# Patient Record
Sex: FEMALE | Race: WHITE | NOT HISPANIC OR LATINO | Employment: STUDENT | URBAN - METROPOLITAN AREA
[De-identification: names, ages, dates, MRNs, and addresses within clinical notes are randomized per-mention and may not be internally consistent; named-entity substitution may affect disease eponyms.]

---

## 2017-01-24 ENCOUNTER — APPOINTMENT (EMERGENCY)
Dept: CT IMAGING | Facility: HOSPITAL | Age: 22
End: 2017-01-24
Payer: COMMERCIAL

## 2017-01-24 ENCOUNTER — HOSPITAL ENCOUNTER (EMERGENCY)
Facility: HOSPITAL | Age: 22
Discharge: HOME | End: 2017-01-24
Attending: EMERGENCY MEDICINE | Admitting: EMERGENCY MEDICINE
Payer: COMMERCIAL

## 2017-01-24 VITALS
OXYGEN SATURATION: 99 % | DIASTOLIC BLOOD PRESSURE: 58 MMHG | WEIGHT: 119 LBS | TEMPERATURE: 97.9 F | SYSTOLIC BLOOD PRESSURE: 106 MMHG | RESPIRATION RATE: 17 BRPM | HEART RATE: 81 BPM

## 2017-01-24 DIAGNOSIS — R51.9 HEADACHE DISORDER: Primary | ICD-10-CM

## 2017-01-24 LAB
ANION GAP SERPL CALCULATED.3IONS-SCNC: 8 MMOL/L (ref 4–13)
BASOPHILS # BLD AUTO: 0.02 THOUSANDS/ΜL (ref 0–0.1)
BASOPHILS NFR BLD AUTO: 0 % (ref 0–1)
BUN SERPL-MCNC: 15 MG/DL (ref 5–25)
CALCIUM SERPL-MCNC: 8.8 MG/DL (ref 8.3–10.1)
CHLORIDE SERPL-SCNC: 103 MMOL/L (ref 100–108)
CO2 SERPL-SCNC: 28 MMOL/L (ref 21–32)
CREAT SERPL-MCNC: 0.6 MG/DL (ref 0.6–1.3)
EOSINOPHIL # BLD AUTO: 0.14 THOUSAND/ΜL (ref 0–0.61)
EOSINOPHIL NFR BLD AUTO: 2 % (ref 0–6)
ERYTHROCYTE [DISTWIDTH] IN BLOOD BY AUTOMATED COUNT: 11.8 % (ref 11.6–15.1)
GFR SERPL CREATININE-BSD FRML MDRD: >60 ML/MIN/1.73SQ M
GLUCOSE SERPL-MCNC: 95 MG/DL (ref 65–140)
HCT VFR BLD AUTO: 37.7 % (ref 34.8–46.1)
HGB BLD-MCNC: 12.9 G/DL (ref 11.5–15.4)
LYMPHOCYTES # BLD AUTO: 1.01 THOUSANDS/ΜL (ref 0.6–4.47)
LYMPHOCYTES NFR BLD AUTO: 14 % (ref 14–44)
MCH RBC QN AUTO: 28.2 PG (ref 26.8–34.3)
MCHC RBC AUTO-ENTMCNC: 34.2 G/DL (ref 31.4–37.4)
MCV RBC AUTO: 83 FL (ref 82–98)
MONOCYTES # BLD AUTO: 0.51 THOUSAND/ΜL (ref 0.17–1.22)
MONOCYTES NFR BLD AUTO: 7 % (ref 4–12)
NEUTROPHILS # BLD AUTO: 5.61 THOUSANDS/ΜL (ref 1.85–7.62)
NEUTS SEG NFR BLD AUTO: 77 % (ref 43–75)
PLATELET # BLD AUTO: 265 THOUSANDS/UL (ref 149–390)
PMV BLD AUTO: 9 FL (ref 8.9–12.7)
POTASSIUM SERPL-SCNC: 3.8 MMOL/L (ref 3.5–5.3)
RBC # BLD AUTO: 4.57 MILLION/UL (ref 3.81–5.12)
SODIUM SERPL-SCNC: 139 MMOL/L (ref 136–145)
WBC # BLD AUTO: 7.29 THOUSAND/UL (ref 4.31–10.16)

## 2017-01-24 PROCEDURE — 99284 EMERGENCY DEPT VISIT MOD MDM: CPT

## 2017-01-24 PROCEDURE — 36415 COLL VENOUS BLD VENIPUNCTURE: CPT | Performed by: PHYSICIAN ASSISTANT

## 2017-01-24 PROCEDURE — 80048 BASIC METABOLIC PNL TOTAL CA: CPT | Performed by: PHYSICIAN ASSISTANT

## 2017-01-24 PROCEDURE — 96374 THER/PROPH/DIAG INJ IV PUSH: CPT

## 2017-01-24 PROCEDURE — 85025 COMPLETE CBC W/AUTO DIFF WBC: CPT | Performed by: PHYSICIAN ASSISTANT

## 2017-01-24 PROCEDURE — 70486 CT MAXILLOFACIAL W/O DYE: CPT

## 2017-01-24 PROCEDURE — 96375 TX/PRO/DX INJ NEW DRUG ADDON: CPT

## 2017-01-24 PROCEDURE — 70450 CT HEAD/BRAIN W/O DYE: CPT

## 2017-01-24 PROCEDURE — 96361 HYDRATE IV INFUSION ADD-ON: CPT

## 2017-01-24 RX ORDER — METOCLOPRAMIDE 10 MG/1
10 TABLET ORAL 4 TIMES DAILY
Qty: 12 TABLET | Refills: 0 | Status: SHIPPED | OUTPATIENT
Start: 2017-01-24 | End: 2017-01-27

## 2017-01-24 RX ORDER — LEVOTHYROXINE SODIUM 0.1 MG/1
100 TABLET ORAL DAILY
COMMUNITY

## 2017-01-24 RX ORDER — FLUCONAZOLE 150 MG/1
150 TABLET ORAL ONCE
Qty: 2 TABLET | Refills: 0 | Status: SHIPPED | OUTPATIENT
Start: 2017-01-24 | End: 2017-01-24

## 2017-01-24 RX ORDER — DIPHENHYDRAMINE HYDROCHLORIDE 50 MG/ML
25 INJECTION INTRAMUSCULAR; INTRAVENOUS ONCE
Status: COMPLETED | OUTPATIENT
Start: 2017-01-24 | End: 2017-01-24

## 2017-01-24 RX ORDER — METOCLOPRAMIDE HYDROCHLORIDE 5 MG/ML
10 INJECTION INTRAMUSCULAR; INTRAVENOUS ONCE
Status: COMPLETED | OUTPATIENT
Start: 2017-01-24 | End: 2017-01-24

## 2017-01-24 RX ADMIN — METOCLOPRAMIDE 10 MG: 5 INJECTION, SOLUTION INTRAMUSCULAR; INTRAVENOUS at 10:50

## 2017-01-24 RX ADMIN — DIPHENHYDRAMINE HYDROCHLORIDE 25 MG: 50 INJECTION, SOLUTION INTRAMUSCULAR; INTRAVENOUS at 10:49

## 2017-01-24 RX ADMIN — SODIUM CHLORIDE 1000 ML: 0.9 INJECTION, SOLUTION INTRAVENOUS at 10:51

## 2018-12-12 ENCOUNTER — OFFICE VISIT (OUTPATIENT)
Dept: OBGYN CLINIC | Facility: CLINIC | Age: 23
End: 2018-12-12
Payer: COMMERCIAL

## 2018-12-12 VITALS
BODY MASS INDEX: 23 KG/M2 | WEIGHT: 125 LBS | HEIGHT: 62 IN | SYSTOLIC BLOOD PRESSURE: 120 MMHG | DIASTOLIC BLOOD PRESSURE: 68 MMHG

## 2018-12-12 DIAGNOSIS — Z01.419 ENCOUNTER FOR GYNECOLOGICAL EXAMINATION WITHOUT ABNORMAL FINDING: ICD-10-CM

## 2018-12-12 DIAGNOSIS — Z11.3 SCREEN FOR STD (SEXUALLY TRANSMITTED DISEASE): Primary | ICD-10-CM

## 2018-12-12 PROCEDURE — 87591 N.GONORRHOEAE DNA AMP PROB: CPT | Performed by: PHYSICIAN ASSISTANT

## 2018-12-12 PROCEDURE — S0610 ANNUAL GYNECOLOGICAL EXAMINA: HCPCS | Performed by: PHYSICIAN ASSISTANT

## 2018-12-12 PROCEDURE — G0145 SCR C/V CYTO,THINLAYER,RESCR: HCPCS | Performed by: PHYSICIAN ASSISTANT

## 2018-12-12 PROCEDURE — 87491 CHLMYD TRACH DNA AMP PROBE: CPT | Performed by: PHYSICIAN ASSISTANT

## 2018-12-12 NOTE — PROGRESS NOTES
Marissa Sanchez  1995    CC:  Yearly exam    S:  26 y o  female here for yearly exam  Her cycles are regular, somewhat heavy and crampy  She is not currently sexually active  She uses condoms and abstinence for contraception  She does request STD testing today  She was dx with Factor V Leiden mutation about two years ago  She was tested because her grandmother is positive  She herself has never had a clot  I asked her to try to get me a copy of her labs to determine if she is homozygous or heterozygous and we discussed implications of this for pregnancy in the future  Last Pap never    Current Outpatient Prescriptions:     levothyroxine 100 mcg tablet, Take 100 mcg by mouth daily, Disp: , Rfl:   Social History     Social History    Marital status: Single     Spouse name: N/A    Number of children: N/A    Years of education: N/A     Occupational History    Not on file  Social History Main Topics    Smoking status: Never Smoker    Smokeless tobacco: Never Used    Alcohol use Yes      Comment: socially     Drug use: No    Sexual activity: Yes     Partners: Male     Birth control/ protection: Condom Male     Other Topics Concern    Not on file     Social History Narrative    No narrative on file     Family History   Problem Relation Age of Onset   Erin Mare Migraines Mother     Thyroid disease Mother     Hypertension Mother     No Known Problems Father     Thyroid disease Sister     Factor V Leiden deficiency Paternal Grandmother      Past Medical History:   Diagnosis Date    Chlamydia     [facility]     Disease of thyroid gland     [facility] Le[facility] carrier [facility]     Migraine     Urinary tract infection          O:  Blood pressure 120/68, height 5' 1 81" (1 57 m), weight 56 7 kg (125 lb), last menstrual period 12/01/2018      Patient appears well and is not in distress  Neck is supple without masses  Breasts are symmetrical without mass, tenderness, nipple discharge, skin changes or adenopathy  Abdomen is soft and nontender without masses  External genitals are normal without lesions or rashes  Vagina is normal without discharge or bleeding  Cervix is normal without discharge or lesion  Uterus is normal, mobile, nontender without palpable mass  Adnexa are normal, nontender, without palpable mass  A:  Yearly exam      P:   Pap and cultures today   Will check Kyleena coverage and contact her to    schedule insertion   RTO one year for yearly exam or sooner as needed

## 2018-12-13 LAB
C TRACH DNA SPEC QL NAA+PROBE: NEGATIVE
N GONORRHOEA DNA SPEC QL NAA+PROBE: NEGATIVE

## 2018-12-14 ENCOUNTER — TELEPHONE (OUTPATIENT)
Dept: OBGYN CLINIC | Facility: CLINIC | Age: 23
End: 2018-12-14

## 2018-12-14 ENCOUNTER — TELEPHONE (OUTPATIENT)
Dept: OBGYN CLINIC | Facility: MEDICAL CENTER | Age: 23
End: 2018-12-14

## 2018-12-14 NOTE — TELEPHONE ENCOUNTER
Spoke to Ciera at [facility]  Pt does not have active coverage on the card on file  Daxa Cordero will call pt to see if her card is not up to date

## 2018-12-14 NOTE — TELEPHONE ENCOUNTER
Pt  interested in Tate Smith with Rita @ [facility], insurance coverage is not active, LMOM to call us back with new insurance information

## 2018-12-17 ENCOUNTER — TELEPHONE (OUTPATIENT)
Dept: OBGYN CLINIC | Facility: CLINIC | Age: 23
End: 2018-12-17

## 2018-12-17 LAB
LAB AP GYN PRIMARY INTERPRETATION: NORMAL
Lab: NORMAL

## 2018-12-19 ENCOUNTER — TELEPHONE (OUTPATIENT)
Dept: OBGYN CLINIC | Facility: CLINIC | Age: 23
End: 2018-12-19

## 2018-12-19 NOTE — TELEPHONE ENCOUNTER
We do not have a current Insurance card on file  I called Bailey Arevalo to check on her status on her insurance and left a message for her to call back

## 2018-12-20 NOTE — TELEPHONE ENCOUNTER
Pt called- she will be bringing her insurance card in next week and we can use that info to call for her Deatra Seat

## 2019-01-03 NOTE — TELEPHONE ENCOUNTER
Spoke to [facility] at "[employer]  Pt is covered for [facility], insertion, and removal, at 100% with no copay or deductible

## 2019-01-08 NOTE — TELEPHONE ENCOUNTER
Prasad and [place] labeled and placed in [facility] room  Pt is scheduled for insertion

## 2019-01-29 ENCOUNTER — OFFICE VISIT (OUTPATIENT)
Dept: OBGYN CLINIC | Facility: CLINIC | Age: 24
End: 2019-01-29
Payer: COMMERCIAL

## 2019-01-29 VITALS — DIASTOLIC BLOOD PRESSURE: 64 MMHG | WEIGHT: 131 LBS | BODY MASS INDEX: 24.11 KG/M2 | SYSTOLIC BLOOD PRESSURE: 100 MMHG

## 2019-01-29 DIAGNOSIS — Z30.430 ENCOUNTER FOR IUD INSERTION: Primary | ICD-10-CM

## 2019-01-29 PROCEDURE — 58300 INSERT INTRAUTERINE DEVICE: CPT | Performed by: PHYSICIAN ASSISTANT

## 2019-01-29 NOTE — PROGRESS NOTES
Quiana Almazan  1995      CC:  IUD insertion    S:  26 y o  female here for IUD insertion  She requests [place] IUD  We reviewed the risks of IUD insertion including pain, perforation, migration, irregular bleeding, failure, and infection  She is aware that with [place] she can expect upt o 6 months of irregular bleeding at at that point her periods should become lighter, shorter, and less crampy, and that 10% of women stop getting periods with their [place]  She did premedicate with ibuprofen and a snack prior to insertion  Written consent was obtained from the patient, myself, and Dr Elisha Guerrero prior to the procedure  Past Medical History:   Diagnosis Date    Chlamydia     [facility]     Disease of thyroid gland     [facility] Leiden carrier ([facility] )     Migraine     Urinary tract infection      Family History   Problem Relation Age of Onset   26 [facility] Migraines Mother     Thyroid disease Mother     Hypertension Mother     No Known Problems Father     Thyroid disease Sister     Factor V Leiden deficiency Paternal Grandmother      Social History     Social History    Marital status: Single     Spouse name: N/A    Number of children: N/A    Years of education: N/A     Social History Main Topics    Smoking status: Never Smoker    Smokeless tobacco: Never Used    Alcohol use Yes      Comment: socially     Drug use: No    Sexual activity: Yes     Partners: Male     Birth control/ protection: Condom Male     Other Topics Concern    None     Social History Narrative    None       O:   Blood pressure 100/64, weight 59 4 kg (131 lb), last menstrual period 01/24/2019  Patient appears well and is not in distress  Abdomen is soft and nontender  External genitals are normal without lesion or rash  Vagina is normal with menses present  Cervix is normal without lesion  PROCEDURE:   The cervix was cleansed with Betadine      The uterus was sounded to 7 cm  IUD was inserted to the fundus without dilation and without tenaculum  Strings were trimmed to 3cm    The patient tolerated the procedure well without complication  A:  IUD insertion  P: The patient will return in one month for IUD check but knows to call sooner should she have problems prior to that visit

## 2019-02-28 ENCOUNTER — OFFICE VISIT (OUTPATIENT)
Dept: OBGYN CLINIC | Facility: CLINIC | Age: 24
End: 2019-02-28
Payer: COMMERCIAL

## 2019-02-28 DIAGNOSIS — N92.6 IRREGULAR BLEEDING: Primary | ICD-10-CM

## 2019-02-28 PROCEDURE — 99213 OFFICE O/P EST LOW 20 MIN: CPT | Performed by: PHYSICIAN ASSISTANT

## 2019-02-28 NOTE — PROGRESS NOTES
Altagracia Ismael  1995      CC: IUD check    S: 26 y o  female here for IUD check  She is status post placement of a [place] IUD on 12/12/18  She has had mild irregular bleeding since placement but nothing bothersome to her  She has had no pain or other side effects  No LMP recorded      Current Outpatient Medications:     levothyroxine 100 mcg tablet, Take 100 mcg by mouth daily, Disp: , Rfl:   Social History     Socioeconomic History    Marital status: Single     Spouse name: Not on file    Number of children: Not on file    Years of education: Not on file    Highest education level: Not on file   Occupational History    Not on file   Social Needs    Financial resource strain: Not on file    Food insecurity:     Worry: Not on file     Inability: Not on file    Transportation needs:     Medical: Not on file     Non-medical: Not on file   Tobacco Use    Smoking status: Never Smoker    Smokeless tobacco: Never Used   Substance and Sexual Activity    Alcohol use: Yes     Comment: socially     Drug use: No    Sexual activity: Yes     Partners: Male     Birth control/protection: Condom Male   Lifestyle    Physical activity:     Days per week: Not on file     Minutes per session: Not on file    Stress: Not on file   Relationships    Social connections:     Talks on phone: Not on file     Gets together: Not on file     Attends Voodoo service: Not on file     Active member of club or organization: Not on file     Attends meetings of clubs or organizations: Not on file     Relationship status: Not on file    Intimate partner violence:     Fear of current or ex partner: Not on file     Emotionally abused: Not on file     Physically abused: Not on file     Forced sexual activity: Not on file   Other Topics Concern    Not on file   Social History Narrative    Not on file     Family History   Problem Relation Age of Onset    Migraines Mother     Thyroid disease Mother     Hypertension Mother     No Known Problems Father     Thyroid disease Sister     Factor V Leiden deficiency Paternal Grandmother      Past Medical History:   Diagnosis Date    Chlamydia     [facility]     Disease of thyroid gland     Factor V Leiden carrier ([facility] )     Migraine     Urinary tract infection        O:  There were no vitals taken for this visit  Abdomen is soft and nontender  External genitals are normal without rashes or lesions  Vagina is normal without discharge or bleeding  Cervix is normal without discharge or lesion  IUD strings are normal      A:  IUD in place    P:  Patient was reassured that irregular bleeding is common for the first six months of IUD use and that this should slowly resolve over time  She will call with any persistently abnormal bleeding or other problems  She will return for her yearly exam as scheduled

## 2019-04-10 ENCOUNTER — OFFICE VISIT (OUTPATIENT)
Dept: OBGYN CLINIC | Facility: CLINIC | Age: 24
End: 2019-04-10
Payer: COMMERCIAL

## 2019-04-10 VITALS — WEIGHT: 127 LBS | SYSTOLIC BLOOD PRESSURE: 130 MMHG | BODY MASS INDEX: 23.37 KG/M2 | DIASTOLIC BLOOD PRESSURE: 78 MMHG

## 2019-04-10 DIAGNOSIS — N89.8 VAGINAL ITCHING: Primary | ICD-10-CM

## 2019-04-10 PROCEDURE — 87660 TRICHOMONAS VAGIN DIR PROBE: CPT | Performed by: PHYSICIAN ASSISTANT

## 2019-04-10 PROCEDURE — 99214 OFFICE O/P EST MOD 30 MIN: CPT | Performed by: PHYSICIAN ASSISTANT

## 2019-04-10 PROCEDURE — 87480 CANDIDA DNA DIR PROBE: CPT | Performed by: PHYSICIAN ASSISTANT

## 2019-04-10 PROCEDURE — 87510 GARDNER VAG DNA DIR PROBE: CPT | Performed by: PHYSICIAN ASSISTANT

## 2019-04-10 RX ORDER — METRONIDAZOLE 7.5 MG/G
1 GEL VAGINAL
Qty: 70 G | Refills: 0 | Status: SHIPPED | OUTPATIENT
Start: 2019-04-10 | End: 2019-04-15

## 2019-04-12 LAB
CANDIDA RRNA VAG QL PROBE: NEGATIVE
G VAGINALIS RRNA GENITAL QL PROBE: NEGATIVE
T VAGINALIS RRNA GENITAL QL PROBE: NEGATIVE

## 2019-12-20 ENCOUNTER — ANNUAL EXAM (OUTPATIENT)
Dept: OBGYN CLINIC | Facility: CLINIC | Age: 24
End: 2019-12-20
Payer: COMMERCIAL

## 2019-12-20 VITALS
SYSTOLIC BLOOD PRESSURE: 120 MMHG | WEIGHT: 131 LBS | BODY MASS INDEX: 24.11 KG/M2 | DIASTOLIC BLOOD PRESSURE: 68 MMHG | HEIGHT: 62 IN

## 2019-12-20 DIAGNOSIS — Z01.419 ENCNTR FOR GYN EXAM (GENERAL) (ROUTINE) W/O ABN FINDINGS: ICD-10-CM

## 2019-12-20 PROBLEM — N89.8 VAGINAL ITCHING: Status: RESOLVED | Noted: 2019-04-10 | Resolved: 2019-12-20

## 2019-12-20 PROBLEM — D68.51 FACTOR V LEIDEN MUTATION (HCC): Status: ACTIVE | Noted: 2019-12-20

## 2019-12-20 PROCEDURE — S0612 ANNUAL GYNECOLOGICAL EXAMINA: HCPCS | Performed by: PHYSICIAN ASSISTANT

## 2019-12-20 PROCEDURE — 87591 N.GONORRHOEAE DNA AMP PROB: CPT | Performed by: PHYSICIAN ASSISTANT

## 2019-12-20 PROCEDURE — 87491 CHLMYD TRACH DNA AMP PROBE: CPT | Performed by: PHYSICIAN ASSISTANT

## 2019-12-20 NOTE — PROGRESS NOTES
Elena Vera  1995    CC:  Yearly exam    S:  27 y o  female here for yearly exam  Her cycles are regular, not heavy or crampy  They are shorter and lighter than prior to her [place]  Recall she has Factor V and is not an estrogen candidate  Sexual activity: She is sexually active with no current partner without pain, bleeding or dryness  Contraception:  She uses [place] for contraception  STD testing:  She does request STD testing today  We reviewed [facility] guidelines for Pap testing       Last Pap 12/12/18 neg    Family hx of breast cancer: no  Family hx of ovarian cancer: no  Family hx of colon cancer: no    Current Outpatient Medications:     levonorgestrel (KYLEENA) 19 5 MG intrauterine device, 1 Intra Uterine Device by Intrauterine route once, Disp: , Rfl:     levothyroxine 100 mcg tablet, Take 100 mcg by mouth daily, Disp: , Rfl:   Social History     Socioeconomic History    Marital status: Single     Spouse name: Not on file    Number of children: Not on file    Years of education: Not on file    Highest education level: Not on file   Occupational History    Not on file   Social Needs    Financial resource strain: Not on file    Food insecurity:     Worry: Not on file     Inability: Not on file    Transportation needs:     Medical: Not on file     Non-medical: Not on file   Tobacco Use    Smoking status: Never Smoker    Smokeless tobacco: Never Used   Substance and Sexual Activity    Alcohol use: Yes     Comment: socially     Drug use: No    Sexual activity: Yes     Partners: Male     Birth control/protection: Condom Male, IUD     Comment: [facility] 1/2019   Lifestyle    Physical activity:     Days per week: Not on file     Minutes per session: Not on file    Stress: Not on file   Relationships    Social connections:     Talks on phone: Not on file     Gets together: Not on file     Attends Mandaeism service: Not on file     Active member of club or organization: Not on file     Attends meetings of clubs or organizations: Not on file     Relationship status: Not on file    Intimate partner violence:     Fear of current or ex partner: Not on file     Emotionally abused: Not on file     Physically abused: Not on file     Forced sexual activity: Not on file   Other Topics Concern    Not on file   Social History Narrative    Not on file     Family History   Problem Relation Age of Onset    Migraines Mother     Thyroid disease Mother     Hypertension Mother     No Known Problems Father     Thyroid disease Sister     Factor V Leiden deficiency Paternal Grandmother      Past Medical History:   Diagnosis Date    Chlamydia     [facility] ([facility] )     Disease of thyroid gland     Factor [facility] Leiden carrier ([facility] )     Migraine     Urinary tract infection        Review of Systems   Respiratory: Negative  Cardiovascular: Negative  Gastrointestinal: Negative for constipation and diarrhea  Genitourinary: Negative for difficulty urinating, pelvic pain, vaginal bleeding, vaginal discharge, itching or odor  O:  Blood pressure 120/68, height 5' 2 21" (1 58 m), weight 59 4 kg (131 lb), last menstrual period 12/03/2019  Patient appears well and is not in distress  Neck is supple without masses  Breasts are symmetrical without mass, tenderness, nipple discharge, skin changes or adenopathy  Abdomen is soft and nontender without masses  External genitals are normal without lesions or rashes  Urethra and urethral meatus are normal  Bladder is normal to palpation  Vagina is normal without discharge or bleeding  Cervix is normal without discharge or lesion  IUD strings are normal    Uterus is normal, mobile, nontender without palpable mass  Adnexa are normal, nontender, without palpable mass  A:  Yearly exam      P:   Pap 2021   GC/chlamydia today     RTO one year for yearly exam or sooner as needed

## 2019-12-24 LAB
C TRACH DNA SPEC QL NAA+PROBE: NEGATIVE
N GONORRHOEA DNA SPEC QL NAA+PROBE: NEGATIVE

## 2020-04-03 ENCOUNTER — TELEMEDICINE (OUTPATIENT)
Dept: OBGYN CLINIC | Facility: CLINIC | Age: 25
End: 2020-04-03
Payer: COMMERCIAL

## 2020-04-03 DIAGNOSIS — Z11.3 SCREEN FOR STD (SEXUALLY TRANSMITTED DISEASE): Primary | ICD-10-CM

## 2020-04-03 PROCEDURE — 99213 OFFICE O/P EST LOW 20 MIN: CPT | Performed by: PHYSICIAN ASSISTANT

## 2020-04-06 ENCOUNTER — APPOINTMENT (OUTPATIENT)
Dept: LAB | Facility: HOSPITAL | Age: 25
End: 2020-04-06
Payer: COMMERCIAL

## 2020-04-06 ENCOUNTER — TRANSCRIBE ORDERS (OUTPATIENT)
Dept: LAB | Facility: HOSPITAL | Age: 25
End: 2020-04-06

## 2020-04-06 DIAGNOSIS — Z11.3 SCREEN FOR STD (SEXUALLY TRANSMITTED DISEASE): ICD-10-CM

## 2020-04-06 PROCEDURE — 87591 N.GONORRHOEAE DNA AMP PROB: CPT

## 2020-04-06 PROCEDURE — 87491 CHLMYD TRACH DNA AMP PROBE: CPT

## 2020-04-08 LAB
C TRACH DNA SPEC QL NAA+PROBE: NEGATIVE
N GONORRHOEA DNA SPEC QL NAA+PROBE: NEGATIVE

## 2020-08-14 ENCOUNTER — TELEPHONE (OUTPATIENT)
Dept: OBGYN CLINIC | Facility: CLINIC | Age: 25
End: 2020-08-14

## 2020-08-14 NOTE — TELEPHONE ENCOUNTER
----- Message from Primo Patel sent at 8/13/2020 11:59 PM EDT -----  Regarding: Non-Urgent Medical Question  Contact: [phone]  Krunal Gottlieb, I hope you are well  I recently have become intimate with a new partner  I wanted to get checked for STIs  Do I need to schedule an appt with you or should I go through the lab services again?     Kimberly Conrad

## 2020-08-14 NOTE — TELEPHONE ENCOUNTER
I called and spoke to pt inquiring if any symptoms  She is not having anything- I told her if she can call to see if there are any cancellations/if any concerns arise or if she develops symptoms  I have her scheduled with Dr Jj Wiggins on wed 8/26 at 2:40 in [place] office  She verbalized understanding

## 2020-08-26 ENCOUNTER — OFFICE VISIT (OUTPATIENT)
Dept: OBGYN CLINIC | Facility: CLINIC | Age: 25
End: 2020-08-26
Payer: COMMERCIAL

## 2020-08-26 VITALS
BODY MASS INDEX: 23.44 KG/M2 | WEIGHT: 129 LBS | TEMPERATURE: 97.9 F | SYSTOLIC BLOOD PRESSURE: 122 MMHG | DIASTOLIC BLOOD PRESSURE: 80 MMHG

## 2020-08-26 DIAGNOSIS — Z11.3 SCREENING FOR STD (SEXUALLY TRANSMITTED DISEASE): Primary | ICD-10-CM

## 2020-08-26 DIAGNOSIS — B37.3 YEAST VAGINITIS: ICD-10-CM

## 2020-08-26 LAB
BV WHIFF TEST VAG QL: NEGATIVE
CLUE CELLS SPEC QL WET PREP: NEGATIVE
PH SMN: ABNORMAL [PH]
T VAGINALIS VAG QL WET PREP: NEGATIVE
YEAST VAG QL WET PREP: ABNORMAL

## 2020-08-26 PROCEDURE — 87491 CHLMYD TRACH DNA AMP PROBE: CPT | Performed by: STUDENT IN AN ORGANIZED HEALTH CARE EDUCATION/TRAINING PROGRAM

## 2020-08-26 PROCEDURE — 87591 N.GONORRHOEAE DNA AMP PROB: CPT | Performed by: STUDENT IN AN ORGANIZED HEALTH CARE EDUCATION/TRAINING PROGRAM

## 2020-08-26 PROCEDURE — 99214 OFFICE O/P EST MOD 30 MIN: CPT | Performed by: STUDENT IN AN ORGANIZED HEALTH CARE EDUCATION/TRAINING PROGRAM

## 2020-08-26 PROCEDURE — 87210 SMEAR WET MOUNT SALINE/INK: CPT | Performed by: STUDENT IN AN ORGANIZED HEALTH CARE EDUCATION/TRAINING PROGRAM

## 2020-08-26 RX ORDER — NYSTATIN AND TRIAMCINOLONE ACETONIDE 100000; 1 [USP'U]/G; MG/G
OINTMENT TOPICAL 2 TIMES DAILY
Qty: 30 G | Refills: 0 | Status: SHIPPED | OUTPATIENT
Start: 2020-08-26 | End: 2020-12-14 | Stop reason: ALTCHOICE

## 2020-08-26 RX ORDER — FLUCONAZOLE 150 MG/1
150 TABLET ORAL ONCE
Qty: 1 TABLET | Refills: 0 | Status: SHIPPED | OUTPATIENT
Start: 2020-08-26 | End: 2020-08-26

## 2020-08-26 RX ORDER — VALACYCLOVIR HYDROCHLORIDE 1 G/1
TABLET, FILM COATED ORAL
COMMUNITY
Start: 2020-07-13

## 2020-08-26 NOTE — ASSESSMENT & PLAN NOTE
26 yo  here for STD testing  New partner since early August with vaginal irritation  Essentially resolved after last cycle  GCCT sent from urine  Serum screening per request    Wet mount with hyphae, diflucan and mycolog sent  To return if worsens or no improvement

## 2020-08-26 NOTE — PROGRESS NOTES
Assessment/Plan:   Screening for STD (sexually transmitted disease)  26 yo  here for STD testing  New partner since early August with vaginal irritation  Essentially resolved after last cycle  GCCT sent from urine  Serum screening per request    Wet mount with hyphae, diflucan and mycolog sent  To return if worsens or no improvement  Reviewed condom use  Diagnoses and all orders for this visit:    Screening for STD (sexually transmitted disease)  -     RPR; Future  -     Hepatitis B core antibody, IgM; Future  -     Hepatitis C antibody; Future  -     Chlamydia/GC amplified DNA by PCR  -     HIV 1/2 Antigen/Antibody (4th Generation) w Reflex [facility]; Future    Other orders  -     Cancel: HIV 1/2 ANTIGEN/ANTIBODY (4TH GENERATION) W REFLEX [facility]; Future  -     valACYclovir (VALTREX) 1,000 mg tablet; TAKE 1 TABLET BY MOUTH TWICE A DAY FOR 2 DAYS          Subjective:     Patient ID: Joy Villafana is a 27 y o  female  26 yo here for problem visit with vaginal irritation  She reports shortly after being with new partner for first time in early august onset of vaginal discharge, irritation and odor  She then started her cycle  and her symptoms resolved with exception of mild irritation at introitus  She has regular periods with Sac Gander  She uses condoms some of the time  Does want serum screening as well  Review of Systems   Constitutional: Negative for chills and fever  Respiratory: Negative for shortness of breath  Cardiovascular: Negative for chest pain  Gastrointestinal: Negative for abdominal pain, constipation, diarrhea and nausea  Genitourinary: Positive for vaginal pain  Negative for dyspareunia, dysuria, frequency, urgency, vaginal bleeding and vaginal discharge  Objective:     Physical Exam  Constitutional:       Appearance: Normal appearance  HENT:      Head: Normocephalic and atraumatic  Eyes:      Extraocular Movements: Extraocular movements intact  Pulmonary:      Effort: Pulmonary effort is normal  No respiratory distress  Abdominal:      General: There is no distension  Palpations: There is no mass  Tenderness: There is no abdominal tenderness  There is no guarding or rebound  Hernia: No hernia is present  Genitourinary:     General: Normal vulva  Exam position: Lithotomy position  Labia:         Right: No rash, tenderness, lesion or injury  Left: No rash, tenderness, lesion or injury  Vagina: Normal  No vaginal discharge, erythema, tenderness, bleeding or lesions  Cervix: No cervical motion tenderness, discharge, friability, lesion or erythema  Uterus: Normal  Not enlarged, not fixed and not tender  Adnexa: Right adnexa normal and left adnexa normal         Right: No mass, tenderness or fullness  Left: No mass, tenderness or fullness  Neurological:      Mental Status: She is alert

## 2020-08-27 ENCOUNTER — APPOINTMENT (OUTPATIENT)
Dept: LAB | Facility: HOSPITAL | Age: 25
End: 2020-08-27
Attending: STUDENT IN AN ORGANIZED HEALTH CARE EDUCATION/TRAINING PROGRAM
Payer: COMMERCIAL

## 2020-08-27 DIAGNOSIS — Z11.3 SCREENING FOR STD (SEXUALLY TRANSMITTED DISEASE): ICD-10-CM

## 2020-08-27 LAB
HBV CORE IGM SER QL: NORMAL
HCV AB SER QL: NORMAL

## 2020-08-27 PROCEDURE — 36415 COLL VENOUS BLD VENIPUNCTURE: CPT

## 2020-08-27 PROCEDURE — 86705 HEP B CORE ANTIBODY IGM: CPT

## 2020-08-27 PROCEDURE — 86803 HEPATITIS C AB TEST: CPT

## 2020-08-27 PROCEDURE — 86592 SYPHILIS TEST NON-TREP QUAL: CPT

## 2020-08-27 PROCEDURE — 87389 HIV-1 AG W/HIV-1&-2 AB AG IA: CPT

## 2020-08-28 LAB
C TRACH DNA SPEC QL NAA+PROBE: NEGATIVE
HIV 1+2 AB+HIV1 P24 AG SERPL QL IA: NORMAL
N GONORRHOEA DNA SPEC QL NAA+PROBE: NEGATIVE
RPR SER QL: NORMAL

## 2020-08-31 ENCOUNTER — TELEPHONE (OUTPATIENT)
Dept: OBGYN CLINIC | Facility: CLINIC | Age: 25
End: 2020-08-31

## 2020-08-31 DIAGNOSIS — Z11.3 SCREENING FOR STD (SEXUALLY TRANSMITTED DISEASE): Primary | ICD-10-CM

## 2020-08-31 NOTE — TELEPHONE ENCOUNTER
I spoke with Sharon Peters, she does want to have the correct test done, I will place order for her

## 2020-08-31 NOTE — TELEPHONE ENCOUNTER
----- Message from Jarad Soto MD sent at 8/27/2020 11:38 AM EDT -----  Can we see if they can add on Hep B S Ag  The incorrect hep B lab was ordered    ----- Message -----  From: Lab, Background User  Sent: 8/27/2020  11:21 AM EDT  To: Jarad Soto MD

## 2020-08-31 NOTE — TELEPHONE ENCOUNTER
I spoke with lab, they no longer have the specimen  Would you like us to have patient go to lab and have drawn

## 2020-09-04 ENCOUNTER — TRANSCRIBE ORDERS (OUTPATIENT)
Dept: ADMINISTRATIVE | Age: 25
End: 2020-09-04

## 2020-09-04 ENCOUNTER — APPOINTMENT (OUTPATIENT)
Dept: LAB | Age: 25
End: 2020-09-04
Payer: COMMERCIAL

## 2020-09-04 DIAGNOSIS — Z11.3 SCREENING FOR STD (SEXUALLY TRANSMITTED DISEASE): ICD-10-CM

## 2020-09-04 LAB — HBV SURFACE AG SER QL: NORMAL

## 2020-09-04 PROCEDURE — 87340 HEPATITIS B SURFACE AG IA: CPT

## 2020-09-04 PROCEDURE — 36415 COLL VENOUS BLD VENIPUNCTURE: CPT

## 2020-11-20 ENCOUNTER — OFFICE VISIT (OUTPATIENT)
Dept: URGENT CARE | Age: 25
End: 2020-11-20
Payer: COMMERCIAL

## 2020-11-20 VITALS — HEART RATE: 83 BPM | OXYGEN SATURATION: 99 % | TEMPERATURE: 99.5 F | RESPIRATION RATE: 16 BRPM

## 2020-11-20 DIAGNOSIS — R52 GENERALIZED BODY ACHES: ICD-10-CM

## 2020-11-20 DIAGNOSIS — R05.9 COUGH: Primary | ICD-10-CM

## 2020-11-20 PROCEDURE — U0003 INFECTIOUS AGENT DETECTION BY NUCLEIC ACID (DNA OR RNA); SEVERE ACUTE RESPIRATORY SYNDROME CORONAVIRUS 2 (SARS-COV-2) (CORONAVIRUS DISEASE [COVID-19]), AMPLIFIED PROBE TECHNIQUE, MAKING USE OF HIGH THROUGHPUT TECHNOLOGIES AS DESCRIBED BY CMS-2020-01-R: HCPCS | Performed by: PHYSICIAN ASSISTANT

## 2020-11-20 PROCEDURE — 99202 OFFICE O/P NEW SF 15 MIN: CPT | Performed by: PHYSICIAN ASSISTANT

## 2020-11-22 LAB — SARS-COV-2 RNA SPEC QL NAA+PROBE: NOT DETECTED

## 2020-12-10 ENCOUNTER — OFFICE VISIT (OUTPATIENT)
Dept: URGENT CARE | Age: 25
End: 2020-12-10
Payer: COMMERCIAL

## 2020-12-10 VITALS
HEIGHT: 63 IN | TEMPERATURE: 97.2 F | HEART RATE: 85 BPM | RESPIRATION RATE: 18 BRPM | BODY MASS INDEX: 22.5 KG/M2 | WEIGHT: 127 LBS | OXYGEN SATURATION: 97 %

## 2020-12-10 DIAGNOSIS — Z20.822 EXPOSURE TO COVID-19 VIRUS: Primary | ICD-10-CM

## 2020-12-10 PROCEDURE — U0003 INFECTIOUS AGENT DETECTION BY NUCLEIC ACID (DNA OR RNA); SEVERE ACUTE RESPIRATORY SYNDROME CORONAVIRUS 2 (SARS-COV-2) (CORONAVIRUS DISEASE [COVID-19]), AMPLIFIED PROBE TECHNIQUE, MAKING USE OF HIGH THROUGHPUT TECHNOLOGIES AS DESCRIBED BY CMS-2020-01-R: HCPCS | Performed by: PHYSICIAN ASSISTANT

## 2020-12-10 PROCEDURE — 99212 OFFICE O/P EST SF 10 MIN: CPT | Performed by: PHYSICIAN ASSISTANT

## 2020-12-12 LAB — SARS-COV-2 RNA SPEC QL NAA+PROBE: NOT DETECTED

## 2020-12-15 ENCOUNTER — ANNUAL EXAM (OUTPATIENT)
Dept: OBGYN CLINIC | Facility: CLINIC | Age: 25
End: 2020-12-15
Payer: COMMERCIAL

## 2020-12-15 VITALS
SYSTOLIC BLOOD PRESSURE: 122 MMHG | DIASTOLIC BLOOD PRESSURE: 70 MMHG | WEIGHT: 129 LBS | BODY MASS INDEX: 22.86 KG/M2 | HEIGHT: 63 IN

## 2020-12-15 DIAGNOSIS — Z01.419 ENCNTR FOR GYN EXAM (GENERAL) (ROUTINE) W/O ABN FINDINGS: Primary | ICD-10-CM

## 2020-12-15 PROBLEM — Z11.3 SCREENING FOR STD (SEXUALLY TRANSMITTED DISEASE): Status: RESOLVED | Noted: 2020-08-26 | Resolved: 2020-12-15

## 2020-12-15 PROCEDURE — S0612 ANNUAL GYNECOLOGICAL EXAMINA: HCPCS | Performed by: PHYSICIAN ASSISTANT

## 2021-06-04 ENCOUNTER — OFFICE VISIT (OUTPATIENT)
Dept: OBGYN CLINIC | Facility: CLINIC | Age: 26
End: 2021-06-04
Payer: COMMERCIAL

## 2021-06-04 VITALS — BODY MASS INDEX: 23.74 KG/M2 | WEIGHT: 134 LBS | SYSTOLIC BLOOD PRESSURE: 120 MMHG | DIASTOLIC BLOOD PRESSURE: 80 MMHG

## 2021-06-04 DIAGNOSIS — N89.8 VAGINAL ITCHING: ICD-10-CM

## 2021-06-04 DIAGNOSIS — Z11.3 SCREENING FOR STD (SEXUALLY TRANSMITTED DISEASE): Primary | ICD-10-CM

## 2021-06-04 LAB
BV WHIFF TEST VAG QL: NEGATIVE
CLUE CELLS SPEC QL WET PREP: NEGATIVE
PH SMN: 4.5 [PH]
SL AMB POCT WET MOUNT: ABNORMAL
T VAGINALIS VAG QL WET PREP: NEGATIVE
YEAST VAG QL WET PREP: POSITIVE

## 2021-06-04 PROCEDURE — 87110 CHLAMYDIA CULTURE: CPT | Performed by: NURSE PRACTITIONER

## 2021-06-04 PROCEDURE — 99213 OFFICE O/P EST LOW 20 MIN: CPT | Performed by: NURSE PRACTITIONER

## 2021-06-04 PROCEDURE — 87210 SMEAR WET MOUNT SALINE/INK: CPT | Performed by: NURSE PRACTITIONER

## 2021-06-04 RX ORDER — FLUCONAZOLE 150 MG/1
TABLET ORAL
Qty: 2 TABLET | Refills: 1 | Status: SHIPPED | OUTPATIENT
Start: 2021-06-04 | End: 2021-06-07

## 2021-06-04 NOTE — ASSESSMENT & PLAN NOTE
Exam consistent with candidiasis  Recommended Diflucan, terazole cream, conservative vulvar hygiene, pelvic rest until sx fully resolved  Discussed common triggers and what to avoid  Reviewed sx to report and reasons to call

## 2021-06-04 NOTE — PROGRESS NOTES
Screening for STD (sexually transmitted disease)  Had CT in April  No symptoms  Treated at [facility] along with partner  ALEJO sent today  Vaginal itching  Exam consistent with candidiasis  Recommended Diflucan, terazole cream, conservative vulvar hygiene, pelvic rest until sx fully resolved  Discussed common triggers and what to avoid  Reviewed sx to report and reasons to call  Diagnoses and all orders for this visit:    Screening for STD (sexually transmitted disease)  -     Chlamydia/GC amplified DNA by PCR; Future    Vaginal itching  -     POCT wet mount  -     fluconazole (DIFLUCAN) 150 mg tablet; Take one tablet po today and repeat 3 days  -     terconazole (TERAZOL 3) 0 8 % vaginal cream; Insert 1 applicator into the vagina daily at bedtime         Call if no symptom improvement, all questions answered, return for annual       Mallyfarrah Mickey 23 y o  presents with vaginal complaints of vaginal itching and a white discharge for one week  Denies vaginal odor  She was treated @  for CT in April  She denies any OTC  treatments  Denies douching  Denies fever, pelvic/abdominal pain, UTI symptoms or dyspareunia  Denies new sexual partners  Agrees to [facility] for CT       Past Medical History:   Diagnosis Date    Chlamydia     [facility]     Disease of thyroid gland     [facility]     Migraine     Urinary tract infection      Past Surgical History:   Procedure Laterality Date    RHINOPLASTY      SEPTOPLASTY       Social History     Tobacco Use    Smoking status: Never Smoker    Smokeless tobacco: Never Used   Substance Use Topics    Alcohol use: Yes     Comment: socially     Drug use: No     Family History   Problem Relation Age of Onset   Camelia Clunes Migraines Mother     Thyroid disease Mother     Hypertension Mother     No Known Problems Father     Thyroid disease Sister     Factor V Leiden deficiency Paternal Grandmother        Current Outpatient Medications:     levonorgestrel (KYLEENA) 19 5 MG intrauterine device, 1 Intra Uterine Device by Intrauterine route once, Disp: , Rfl:     levothyroxine 100 mcg tablet, Take 100 mcg by mouth daily, Disp: , Rfl:     valACYclovir (VALTREX) 1,000 mg tablet, TAKE 1 TABLET BY MOUTH TWICE A DAY FOR 2 DAYS, Disp: , Rfl:     fluconazole (DIFLUCAN) 150 mg tablet, Take one tablet po today and repeat 3 days, Disp: 2 tablet, Rfl: 1    terconazole (TERAZOL 3) 0 8 % vaginal cream, Insert 1 applicator into the vagina daily at bedtime, Disp: 20 g, Rfl: 0    No Known Allergies  OB History    Para Term  AB Living   1       1 0   SAB TAB Ectopic Multiple Live Births     1            # Outcome Date GA Lbr Janes/2nd Weight Sex Delivery Anes PTL Lv   1 TAB                Vitals:    21 0923   BP: 120/80   BP Location: Right arm   Patient Position: Sitting   Cuff Size: Standard   Weight: 60 8 kg (134 lb)     Body mass index is 23 74 kg/m²  Review of Systems   Constitutional: Negative for chills, fatigue, fever and unexpected weight change  Respiratory: Negative for shortness of breath  Gastrointestinal: Negative for abdominal pain    Genitourinary: Negative for difficulty urinating, frequency, dysuria, hematuria, pelvic pain and dyspareunia    + for vaginal discharge and itching  No vaginal odor    Physical Exam   /80 (BP Location: Right arm, Patient Position: Sitting, Cuff Size: Standard)   Wt 60 8 kg (134 lb)   LMP 2021 (Exact Date)   BMI 23 74 kg/m²   She appears well and is in no distress  Abdomen is soft and nontender without masses  External genitals are normal without lesions or rashes  Urethral meatus and urethra are normal  Bladder is normal to palpation  Vagina is normal with moderate white discharge or bleeding  Cervix is normal without discharge or lesion  IUD strings visualized   Uterus is normal, mobile, nontender without palpable mass  Adnexa are normal, nontender, without palpable mass     Skin warm and dry  Capillary refill < 2 seconds  Alert and oriented x 3 with normal affect      Wet mount showed ph 4 5 negative clue cells, wbc, trich, whiff test   Positive for yeast

## 2021-06-04 NOTE — ASSESSMENT & PLAN NOTE
Had CT in April  No symptoms  Treated at [facility] along with partner  ALEJO sent today

## 2021-06-05 ENCOUNTER — TELEPHONE (OUTPATIENT)
Dept: LAB | Facility: HOSPITAL | Age: 26
End: 2021-06-05

## 2021-06-08 NOTE — TELEPHONE ENCOUNTER
Deepthi Saucedo,     Yes we have the the UTM Specimen  Testing for cure is usually a chlamydia culture which [facility] is correct  Please order the corrected testing and let [facility] know when order is placed we will be happy to send the sample for testing

## 2021-06-11 LAB — C TRACH SPEC QL CULT: NEGATIVE

## 2021-06-16 ENCOUNTER — TELEPHONE (OUTPATIENT)
Dept: OBGYN CLINIC | Facility: CLINIC | Age: 26
End: 2021-06-16

## 2021-12-17 ENCOUNTER — ANNUAL EXAM (OUTPATIENT)
Dept: OBGYN CLINIC | Facility: CLINIC | Age: 26
End: 2021-12-17
Payer: COMMERCIAL

## 2021-12-17 VITALS
SYSTOLIC BLOOD PRESSURE: 100 MMHG | HEIGHT: 63 IN | BODY MASS INDEX: 23.18 KG/M2 | WEIGHT: 130.8 LBS | DIASTOLIC BLOOD PRESSURE: 60 MMHG

## 2021-12-17 DIAGNOSIS — B96.89 BACTERIAL VAGINOSIS: ICD-10-CM

## 2021-12-17 DIAGNOSIS — Z11.3 SCREEN FOR STD (SEXUALLY TRANSMITTED DISEASE): ICD-10-CM

## 2021-12-17 DIAGNOSIS — N76.0 BACTERIAL VAGINOSIS: ICD-10-CM

## 2021-12-17 DIAGNOSIS — Z01.419 ENCNTR FOR GYN EXAM (GENERAL) (ROUTINE) W/O ABN FINDINGS: Primary | ICD-10-CM

## 2021-12-17 PROBLEM — N89.8 VAGINAL ITCHING: Status: RESOLVED | Noted: 2021-06-04 | Resolved: 2021-12-17

## 2021-12-17 PROCEDURE — G0145 SCR C/V CYTO,THINLAYER,RESCR: HCPCS | Performed by: PHYSICIAN ASSISTANT

## 2021-12-17 PROCEDURE — 87491 CHLMYD TRACH DNA AMP PROBE: CPT | Performed by: PHYSICIAN ASSISTANT

## 2021-12-17 PROCEDURE — S0612 ANNUAL GYNECOLOGICAL EXAMINA: HCPCS | Performed by: PHYSICIAN ASSISTANT

## 2021-12-17 PROCEDURE — 87591 N.GONORRHOEAE DNA AMP PROB: CPT | Performed by: PHYSICIAN ASSISTANT

## 2021-12-17 RX ORDER — TINIDAZOLE 500 MG/1
TABLET ORAL
Qty: 10 TABLET | Refills: 0 | Status: SHIPPED | OUTPATIENT
Start: 2021-12-17 | End: 2021-12-22

## 2021-12-17 RX ORDER — BUPROPION HYDROCHLORIDE 150 MG/1
TABLET ORAL
COMMUNITY
Start: 2021-11-15

## 2021-12-20 LAB
C TRACH DNA SPEC QL NAA+PROBE: NEGATIVE
N GONORRHOEA DNA SPEC QL NAA+PROBE: NEGATIVE

## 2021-12-28 LAB
LAB AP GYN PRIMARY INTERPRETATION: NORMAL
Lab: NORMAL
PATH INTERP SPEC-IMP: NORMAL

## 2022-05-26 ENCOUNTER — APPOINTMENT (OUTPATIENT)
Dept: LAB | Facility: HOSPITAL | Age: 27
End: 2022-05-26
Payer: COMMERCIAL

## 2022-05-26 DIAGNOSIS — Z03.818 ENCNTR FOR OBS FOR SUSP EXPSR TO OTH BIOLG AGENTS RULED OUT: ICD-10-CM

## 2022-05-26 DIAGNOSIS — R53.82 CHRONIC FATIGUE SYNDROME: ICD-10-CM

## 2022-05-26 DIAGNOSIS — E53.9 VITAMIN B-COMPLEX DEFICIENCY: ICD-10-CM

## 2022-05-26 DIAGNOSIS — E55.9 AVITAMINOSIS D: ICD-10-CM

## 2022-05-26 DIAGNOSIS — N95.9 MENOPAUSAL PROBLEM: ICD-10-CM

## 2022-05-26 LAB
BASOPHILS # BLD AUTO: 0.02 THOUSANDS/ΜL (ref 0–0.1)
BASOPHILS NFR BLD AUTO: 0 % (ref 0–1)
EOSINOPHIL # BLD AUTO: 0.07 THOUSAND/ΜL (ref 0–0.61)
EOSINOPHIL NFR BLD AUTO: 1 % (ref 0–6)
ERYTHROCYTE [DISTWIDTH] IN BLOOD BY AUTOMATED COUNT: 11.9 % (ref 11.6–15.1)
HCT VFR BLD AUTO: 41.3 % (ref 34.8–46.1)
HGB BLD-MCNC: 14 G/DL (ref 11.5–15.4)
IMM GRANULOCYTES # BLD AUTO: 0.04 THOUSAND/UL (ref 0–0.2)
IMM GRANULOCYTES NFR BLD AUTO: 1 % (ref 0–2)
LYMPHOCYTES # BLD AUTO: 0.92 THOUSANDS/ΜL (ref 0.6–4.47)
LYMPHOCYTES NFR BLD AUTO: 16 % (ref 14–44)
MCH RBC QN AUTO: 29.2 PG (ref 26.8–34.3)
MCHC RBC AUTO-ENTMCNC: 33.9 G/DL (ref 31.4–37.4)
MCV RBC AUTO: 86 FL (ref 82–98)
MONOCYTES # BLD AUTO: 0.6 THOUSAND/ΜL (ref 0.17–1.22)
MONOCYTES NFR BLD AUTO: 10 % (ref 4–12)
NEUTROPHILS # BLD AUTO: 4.15 THOUSANDS/ΜL (ref 1.85–7.62)
NEUTS SEG NFR BLD AUTO: 72 % (ref 43–75)
NRBC BLD AUTO-RTO: 0 /100 WBCS
PLATELET # BLD AUTO: 232 THOUSANDS/UL (ref 149–390)
PMV BLD AUTO: 9 FL (ref 8.9–12.7)
RBC # BLD AUTO: 4.8 MILLION/UL (ref 3.81–5.12)
WBC # BLD AUTO: 5.8 THOUSAND/UL (ref 4.31–10.16)

## 2022-05-26 PROCEDURE — 85025 COMPLETE CBC W/AUTO DIFF WBC: CPT

## 2022-12-22 ENCOUNTER — ANNUAL EXAM (OUTPATIENT)
Dept: OBGYN CLINIC | Facility: CLINIC | Age: 27
End: 2022-12-22

## 2022-12-22 VITALS — BODY MASS INDEX: 22.89 KG/M2 | DIASTOLIC BLOOD PRESSURE: 68 MMHG | SYSTOLIC BLOOD PRESSURE: 104 MMHG | WEIGHT: 129.2 LBS

## 2022-12-22 DIAGNOSIS — Z01.419 ENCOUNTER FOR GYNECOLOGICAL EXAMINATION (GENERAL) (ROUTINE) WITHOUT ABNORMAL FINDINGS: Primary | ICD-10-CM

## 2022-12-22 DIAGNOSIS — R10.31 RLQ ABDOMINAL PAIN: ICD-10-CM

## 2022-12-22 DIAGNOSIS — R10.2 PELVIC PAIN: ICD-10-CM

## 2022-12-22 NOTE — PROGRESS NOTES
Erin Baeza  1995    Assessment and Plan:  Yearly exam without abnormality      -Pap up to date  We reviewed ASCCP guidelines for Pap testing today     -cyclic abdominal pain: likely ovulation, but given unilateral nature, chronicity, will r/o cyst with TVUS    RTO one year for yearly exam or sooner as needed  CC:  Yearly exam    S:  24 y o  female here for yearly exam      Chad Dennison  LMP 12/7   Contraception: Keven Yost   Last Pap: 12/2021 NILM   - no hx irregular  Non-smoker, social drinker  Exercises irregularly    Doing ok overall  Her cycles are regular, not heavy or crampy  Sexual activity: She is not sexually active without pain, bleeding or dryness  STD testing:  She does not want STD testing today  Gardasil:  She has had the Gardasil series  Family hx of breast cancer: denies  Family hx of ovarian cancer: denies  Family hx of colon cancer: denies    Denies hot flushes, dyspareunia, abnormal uterine bleeding, urinary/fecal incontinence, changes in energy levels, mood         Current Outpatient Medications:   •  levonorgestrel (KYLEENA) 19 5 MG intrauterine device, 1 Intra Uterine Device by Intrauterine route once, Disp: , Rfl:   •  levothyroxine 100 mcg tablet, Take 100 mcg by mouth daily, Disp: , Rfl:   •  valACYclovir (VALTREX) 1,000 mg tablet, TAKE 1 TABLET BY MOUTH TWICE A DAY FOR 2 DAYS, Disp: , Rfl:   •  buPROPion (WELLBUTRIN XL) 150 mg 24 hr tablet, , Disp: , Rfl:   •  terconazole (TERAZOL 3) 0 8 % vaginal cream, Insert 1 applicator into the vagina daily at bedtime (Patient not taking: Reported on 12/22/2022), Disp: 20 g, Rfl: 0  Social History     Socioeconomic History   • Marital status: Single     Spouse name: Not on file   • Number of children: Not on file   • Years of education: Not on file   • Highest education level: Not on file   Occupational History   • Not on file   Tobacco Use   • Smoking status: Never   • Smokeless tobacco: Never   Vaping Use   • Vaping Principal Discharge DX:	 (normal spontaneous vaginal delivery)  Goal:	return to baseline  Instructions for follow-up, activity and diet:	return to baseline, Call the office for an appointment on 4/3 for Blood Pressure check Use: Never used   Substance and Sexual Activity   • Alcohol use: Yes     Comment: On occasion   • Drug use: No   • Sexual activity: Not Currently     Partners: Male     Birth control/protection: Condom Male, I U D  Comment: Te[place]al [place] 1/2019   Other Topics Concern   • Not on file   Social History Narrative   • Not on file     Social Determinants of Health     Financial Resource Strain: Not on file   Food Insecurity: Not on file   Transportation Needs: Not on file   Physical Activity: Not on file   Stress: Not on file   Social Connections: Not on file   Intimate Partner Violence: Not on file   Housing Stability: Not on file     Family History   Problem Relation Age of Onset   • Migraines Mother    • Thyroid disease Mother    • Hypertension Mother    • No Known Problems Father    • Thyroid disease Sister    • Factor V Leiden deficiency Paternal Grandmother    • Diabetes Paternal Grandmother       Past Medical History:   Diagnosis Date   • Chlamydia    • Clotting disorder ([facility] )    • Depression 11/14/2021   • Disease of thyroid gland    • Factor V Leiden carrier ([facility] )    • Hypothyroidism 2002   • Migraine    • Urinary tract infection         Review of Systems   Respiratory: Negative  Cardiovascular: Negative  Gastrointestinal: Negative for constipation and diarrhea  Genitourinary: Negative for difficulty urinating, pelvic pain, vaginal bleeding, vaginal discharge, itching or odor  O:  Blood pressure 104/68, weight 58 6 kg (129 lb 3 2 oz), last menstrual period 12/07/2022  Patient appears well and is not in distress  Neck is supple without masses  Breasts are symmetrical without mass, tenderness, nipple discharge, skin changes or adenopathy  Abdomen is soft and nontender without masses  External genitals are normal without lesions or rashes  Urethral meatus and urethra are normal  Bladder is normal to palpation  Vagina is normal without discharge or bleeding     Cervix is normal without discharge or lesion  IUD strings visualized  Uterus is normal, mobile, nontender without palpable mass  Adnexa are normal, nontender, without palpable mass

## 2023-02-10 ENCOUNTER — HOSPITAL ENCOUNTER (OUTPATIENT)
Dept: RADIOLOGY | Facility: HOSPITAL | Age: 28
Discharge: HOME | End: 2023-02-10
Attending: STUDENT IN AN ORGANIZED HEALTH CARE EDUCATION/TRAINING PROGRAM

## 2023-02-10 DIAGNOSIS — R10.2 PELVIC PAIN: ICD-10-CM

## 2023-11-20 ENCOUNTER — PROCEDURE VISIT (OUTPATIENT)
Dept: OBGYN CLINIC | Facility: CLINIC | Age: 28
End: 2023-11-20

## 2023-11-20 VITALS — DIASTOLIC BLOOD PRESSURE: 72 MMHG | WEIGHT: 138 LBS | SYSTOLIC BLOOD PRESSURE: 110 MMHG | BODY MASS INDEX: 24.45 KG/M2

## 2023-11-20 DIAGNOSIS — Z30.430 ENCOUNTER FOR IUD INSERTION: Primary | ICD-10-CM

## 2023-11-20 DIAGNOSIS — Z30.433 ENCOUNTER FOR IUD REMOVAL AND REINSERTION: ICD-10-CM

## 2023-11-20 PROCEDURE — 87491 CHLMYD TRACH DNA AMP PROBE: CPT | Performed by: PHYSICIAN ASSISTANT

## 2023-11-20 PROCEDURE — 87591 N.GONORRHOEAE DNA AMP PROB: CPT | Performed by: PHYSICIAN ASSISTANT

## 2023-11-20 RX ORDER — HYDROXYZINE PAMOATE 25 MG/1
CAPSULE ORAL
COMMUNITY
Start: 2023-10-03

## 2023-11-20 RX ORDER — BUPROPION HYDROCHLORIDE 300 MG/1
TABLET ORAL
COMMUNITY
Start: 2023-11-04

## 2023-11-20 NOTE — PROGRESS NOTES
Iud removal    Date/Time: 11/20/2023 2:40 PM    Performed by: Cristina Bell PA-C  Authorized by: Cristina Bell PA-C  Universal Protocol:  Consent: Verbal consent obtained. Risks and benefits: risks, benefits and alternatives were discussed  Consent given by: patient  Time out: Immediately prior to procedure a "time out" was called to verify the correct patient, procedure, equipment, support staff and site/side marked as required. Patient understanding: patient states understanding of the procedure being performed  Patient consent: the patient's understanding of the procedure matches consent given  Procedure consent: procedure consent matches procedure scheduled  Relevant documents: relevant documents present and verified  Test results: test results available and properly labeled  Required items: required blood products, implants, devices, and special equipment available  Patient identity confirmed: verbally with patient    Procedure:     Removed with no complications: yes      Other reason for removal:  Expiration  Iud insertions    Date/Time: 11/20/2023 2:40 PM    Performed by: Cristina Bell PA-C  Authorized by: Cristina Bell PA-C    Verbal consent obtained?: Yes    Risks and benefits: Risks, benefits and alternatives were discussed    Consent given by:  Patient  Time Out:     Time out: Immediately prior to the procedure a time out was called    Patient states understanding of procedure being performed: Yes    Patient's understanding of procedure matches consent: Yes    Procedure consent matches procedure scheduled: Yes    Relevant documents present and verified: Yes    Test results available and properly labeled: Yes    Required items: Required blood products, implants, devices and special equipment available    Patient identity confirmed:  Verbally with patient  Procedure:     Pelvic exam performed: yes      Negative GC/chlamydia test: collected today.       Negative urine pregnancy test: iud in place. Negative serum pregnancy test: yes      Cervix cleaned and prepped: yes      Speculum placed in vagina: yes      Uterus sounded: yes      Uterus sound depth (cm):  7    IUD inserted with no complications: yes      IUD type:  Esther Mickijara trimmed: yes    Post-procedure:     Patient tolerated procedure well: yes      Patient will follow up after next period: yes    Comments:      IUD inserted w/o difficulty. She was instructed on post care.   OTC analgesics PRN for discomfort  To call if heavy vaginal bleeding, foul discharge, severe pelvic pain/cramping or fever

## 2023-11-21 LAB
C TRACH DNA SPEC QL NAA+PROBE: NEGATIVE
N GONORRHOEA DNA SPEC QL NAA+PROBE: NEGATIVE

## 2024-01-08 ENCOUNTER — ANNUAL EXAM (OUTPATIENT)
Dept: OBGYN CLINIC | Facility: CLINIC | Age: 29
End: 2024-01-08

## 2024-01-08 VITALS — DIASTOLIC BLOOD PRESSURE: 78 MMHG | WEIGHT: 137.8 LBS | SYSTOLIC BLOOD PRESSURE: 112 MMHG | BODY MASS INDEX: 24.42 KG/M2

## 2024-01-08 DIAGNOSIS — Z01.419 ENCOUNTER FOR GYNECOLOGICAL EXAMINATION (GENERAL) (ROUTINE) WITHOUT ABNORMAL FINDINGS: Primary | ICD-10-CM

## 2024-01-08 PROCEDURE — 99395 PREV VISIT EST AGE 18-39: CPT | Performed by: STUDENT IN AN ORGANIZED HEALTH CARE EDUCATION/TRAINING PROGRAM

## 2024-12-17 NOTE — TELEPHONE ENCOUNTER
Orders:    Vitamin D 25-Hydroxy,Total (for eval of Vitamin D levels); Future     Patient would like us to order a Mardel Camp for her  Patient said she currently has [id number]

## 2025-01-10 ENCOUNTER — ANNUAL EXAM (OUTPATIENT)
Dept: OBGYN CLINIC | Facility: CLINIC | Age: 30
End: 2025-01-10

## 2025-01-10 VITALS — SYSTOLIC BLOOD PRESSURE: 112 MMHG | WEIGHT: 131 LBS | DIASTOLIC BLOOD PRESSURE: 62 MMHG | BODY MASS INDEX: 23.21 KG/M2

## 2025-01-10 DIAGNOSIS — Z72.51 HIGH RISK HETEROSEXUAL BEHAVIOR: ICD-10-CM

## 2025-01-10 DIAGNOSIS — Z01.419 ENCOUNTER FOR GYNECOLOGICAL EXAMINATION (GENERAL) (ROUTINE) WITHOUT ABNORMAL FINDINGS: Primary | ICD-10-CM

## 2025-01-10 DIAGNOSIS — Z11.3 SCREEN FOR STD (SEXUALLY TRANSMITTED DISEASE): ICD-10-CM

## 2025-01-10 PROCEDURE — G0145 SCR C/V CYTO,THINLAYER,RESCR: HCPCS | Performed by: STUDENT IN AN ORGANIZED HEALTH CARE EDUCATION/TRAINING PROGRAM

## 2025-01-10 PROCEDURE — 87661 TRICHOMONAS VAGINALIS AMPLIF: CPT | Performed by: STUDENT IN AN ORGANIZED HEALTH CARE EDUCATION/TRAINING PROGRAM

## 2025-01-10 PROCEDURE — 87491 CHLMYD TRACH DNA AMP PROBE: CPT | Performed by: STUDENT IN AN ORGANIZED HEALTH CARE EDUCATION/TRAINING PROGRAM

## 2025-01-10 PROCEDURE — 99395 PREV VISIT EST AGE 18-39: CPT | Performed by: STUDENT IN AN ORGANIZED HEALTH CARE EDUCATION/TRAINING PROGRAM

## 2025-01-10 PROCEDURE — 87591 N.GONORRHOEAE DNA AMP PROB: CPT | Performed by: STUDENT IN AN ORGANIZED HEALTH CARE EDUCATION/TRAINING PROGRAM

## 2025-01-10 NOTE — PROGRESS NOTES
Feli Laureano Jorge  1995    Assessment and Plan:  Yearly exam without abnormality.     -Pap collected today. We reviewed ASCCP guidelines for Pap testing today.   -STD screening today  -Not planning pregnancy in the next year. Family planning reviewed    RTO one year for yearly exam or sooner as needed.        CC:  Yearly exam    S:  29 y.o. female here for yearly exam.     G1  LMP 12/27  Contraception: Kyleena   Last Pap: 12/2021 NILM     Non-smoker, social drinker     Doing well overall. New sexual partner, but sexually active without pain or dryness. Did have postcoital spotting x1. She does want STD testing today.     Her cycles are regular, not heavy or crampy.     Gardasil:  She has had the Gardasil series.     Family hx of breast cancer: denies   Family hx of ovarian cancer: denies   Family hx of colon cancer: denies     Denies hot flushes, dyspareunia, abnormal uterine bleeding, urinary/fecal incontinence, changes in energy levels, mood.       Current Outpatient Medications:     hydrOXYzine pamoate (VISTARIL) 25 mg capsule, TAKE 1 TO 2 CAPSULES EVERY 6 HOURS AS NEEDED AS DIRECTED MAX 4 CAPS/DAY, Disp: , Rfl:     levonorgestrel (KYLEENA) 19.5 MG intrauterine device, 1 each by Intrauterine route once, Disp: , Rfl:     levothyroxine 100 mcg tablet, Take 100 mcg by mouth daily, Disp: , Rfl:     valACYclovir (VALTREX) 1,000 mg tablet, TAKE 1 TABLET BY MOUTH TWICE A DAY FOR 2 DAYS, Disp: , Rfl:   Social History     Socioeconomic History    Marital status: Single     Spouse name: Not on file    Number of children: Not on file    Years of education: Not on file    Highest education level: Not on file   Occupational History    Not on file   Tobacco Use    Smoking status: Never    Smokeless tobacco: Never   Vaping Use    Vaping status: Never Used   Substance and Sexual Activity    Alcohol use: Yes     Comment: On occasion    Drug use: No    Sexual activity: Not Currently     Partners: Male     Birth  control/protection: Condom Male, I.U.D.     Comment: Joey 11/2023   Other Topics Concern    Not on file   Social History Narrative    Not on file     Social Drivers of Health     Financial Resource Strain: Not on file   Food Insecurity: Not on file   Transportation Needs: Not on file   Physical Activity: Not on file   Stress: Not on file   Social Connections: Not on file   Intimate Partner Violence: Not on file   Housing Stability: Not on file     Family History   Problem Relation Age of Onset    Migraines Mother     Thyroid disease Mother     Hypertension Mother     No Known Problems Father     Thyroid disease Sister     Factor V Leiden deficiency Paternal Grandmother     Diabetes Paternal Grandmother       Past Medical History:   Diagnosis Date    Chlamydia     Clotting disorder ([facility])     Depression 11/14/2021    Disease of thyroid gland     Factor V Leiden carrier ([facility])     Hypothyroidism 2002    Migraine     Urinary tract infection         Review of Systems   Respiratory: Negative.    Cardiovascular: Negative.    Gastrointestinal: Negative for constipation and diarrhea.   Genitourinary: Negative for difficulty urinating, pelvic pain, vaginal bleeding, vaginal discharge, itching or odor.    O:  Blood pressure 112/62, weight 59.4 kg (131 lb), last menstrual period 12/27/2024.    Patient appears well and is not in distress  Neck is supple without masses  Breasts are symmetrical without mass, tenderness, nipple discharge, skin changes or adenopathy.   Abdomen is soft and nontender without masses.   External genitals are normal without lesions or rashes.  Urethral meatus and urethra are normal  Bladder is normal to palpation  Vagina is normal without discharge or bleeding.   Cervix is normal without discharge or lesion. IUD strings visible. Friable cervix.  Uterus is normal, mobile, nontender without palpable mass.  Adnexa are normal, nontender, without palpable mass.

## 2025-01-14 ENCOUNTER — RESULTS FOLLOW-UP (OUTPATIENT)
Dept: OBGYN CLINIC | Facility: CLINIC | Age: 30
End: 2025-01-14

## 2025-01-14 LAB
C TRACH DNA SPEC QL NAA+PROBE: NEGATIVE
N GONORRHOEA DNA SPEC QL NAA+PROBE: NEGATIVE
T VAGINALIS DNA SPEC QL NAA+PROBE: NEGATIVE

## 2025-01-15 LAB
LAB AP GYN PRIMARY INTERPRETATION: NORMAL
Lab: NORMAL

## 2025-03-13 ENCOUNTER — APPOINTMENT (OUTPATIENT)
Dept: LAB | Age: 30
End: 2025-03-13
Payer: COMMERCIAL

## 2025-03-13 DIAGNOSIS — Z11.3 SCREEN FOR STD (SEXUALLY TRANSMITTED DISEASE): ICD-10-CM

## 2025-03-13 LAB
HCV AB SER QL: NORMAL
HIV 1+2 AB+HIV1 P24 AG SERPL QL IA: NORMAL
TREPONEMA PALLIDUM IGG+IGM AB [PRESENCE] IN SERUM OR PLASMA BY IMMUNOASSAY: NORMAL

## 2025-03-13 PROCEDURE — 86780 TREPONEMA PALLIDUM: CPT

## 2025-03-13 PROCEDURE — 86803 HEPATITIS C AB TEST: CPT

## 2025-03-13 PROCEDURE — 87389 HIV-1 AG W/HIV-1&-2 AB AG IA: CPT

## 2025-03-13 PROCEDURE — 36415 COLL VENOUS BLD VENIPUNCTURE: CPT
